# Patient Record
Sex: FEMALE | Race: WHITE | Employment: FULL TIME | URBAN - METROPOLITAN AREA
[De-identification: names, ages, dates, MRNs, and addresses within clinical notes are randomized per-mention and may not be internally consistent; named-entity substitution may affect disease eponyms.]

---

## 2020-07-28 ENCOUNTER — HOSPITAL ENCOUNTER (OUTPATIENT)
Dept: MAMMOGRAPHY | Age: 30
Discharge: HOME | End: 2020-07-28
Attending: NURSE PRACTITIONER
Payer: COMMERCIAL

## 2020-07-28 DIAGNOSIS — N64.3 GALACTORRHEA: ICD-10-CM

## 2020-07-28 PROCEDURE — 76642 ULTRASOUND BREAST LIMITED: CPT

## 2020-07-28 PROCEDURE — 77066 DX MAMMO INCL CAD BI: CPT

## 2023-04-28 SDOH — ECONOMIC STABILITY: HOUSING INSECURITY
IN THE LAST 12 MONTHS, WAS THERE A TIME WHEN YOU DID NOT HAVE A STEADY PLACE TO SLEEP OR SLEPT IN A SHELTER (INCLUDING NOW)?: NO

## 2023-04-28 SDOH — ECONOMIC STABILITY: FOOD INSECURITY: WITHIN THE PAST 12 MONTHS, YOU WORRIED THAT YOUR FOOD WOULD RUN OUT BEFORE YOU GOT MONEY TO BUY MORE.: NEVER TRUE

## 2023-04-28 SDOH — ECONOMIC STABILITY: FOOD INSECURITY: WITHIN THE PAST 12 MONTHS, THE FOOD YOU BOUGHT JUST DIDN'T LAST AND YOU DIDN'T HAVE MONEY TO GET MORE.: NEVER TRUE

## 2023-04-28 SDOH — ECONOMIC STABILITY: TRANSPORTATION INSECURITY
IN THE PAST 12 MONTHS, HAS LACK OF TRANSPORTATION KEPT YOU FROM MEETINGS, WORK, OR FROM GETTING THINGS NEEDED FOR DAILY LIVING?: NO

## 2023-04-28 SDOH — ECONOMIC STABILITY: INCOME INSECURITY: HOW HARD IS IT FOR YOU TO PAY FOR THE VERY BASICS LIKE FOOD, HOUSING, MEDICAL CARE, AND HEATING?: NOT HARD AT ALL

## 2023-05-01 ENCOUNTER — OFFICE VISIT (OUTPATIENT)
Dept: OBGYN CLINIC | Age: 33
End: 2023-05-01
Payer: COMMERCIAL

## 2023-05-01 VITALS — WEIGHT: 189.6 LBS

## 2023-05-01 DIAGNOSIS — Z01.419 WELL WOMAN EXAM: Primary | ICD-10-CM

## 2023-05-01 DIAGNOSIS — R07.9 CHEST PAIN, UNSPECIFIED TYPE: ICD-10-CM

## 2023-05-01 DIAGNOSIS — Z11.51 SCREENING FOR HUMAN PAPILLOMAVIRUS (HPV): ICD-10-CM

## 2023-05-01 DIAGNOSIS — Z13.89 SCREENING FOR GENITOURINARY CONDITION: ICD-10-CM

## 2023-05-01 DIAGNOSIS — Z12.4 SCREENING FOR CERVICAL CANCER: ICD-10-CM

## 2023-05-01 DIAGNOSIS — N64.4 BREAST PAIN: ICD-10-CM

## 2023-05-01 LAB
BILIRUBIN, URINE, POC: NEGATIVE
BLOOD URINE, POC: NEGATIVE
GLUCOSE URINE, POC: NEGATIVE
KETONES, URINE, POC: NEGATIVE
LEUKOCYTE ESTERASE, URINE, POC: NORMAL
NITRITE, URINE, POC: NEGATIVE
PH, URINE, POC: 7 (ref 4.6–8)
PROTEIN,URINE, POC: NORMAL
SPECIFIC GRAVITY, URINE, POC: 1.02 (ref 1–1.03)
URINALYSIS CLARITY, POC: CLEAR
URINALYSIS COLOR, POC: YELLOW
UROBILINOGEN, POC: NORMAL

## 2023-05-01 PROCEDURE — 81003 URINALYSIS AUTO W/O SCOPE: CPT | Performed by: OBSTETRICS & GYNECOLOGY

## 2023-05-01 PROCEDURE — 99395 PREV VISIT EST AGE 18-39: CPT | Performed by: OBSTETRICS & GYNECOLOGY

## 2023-05-08 ENCOUNTER — HOSPITAL ENCOUNTER (OUTPATIENT)
Dept: MAMMOGRAPHY | Age: 33
Discharge: HOME | End: 2023-05-11
Payer: COMMERCIAL

## 2023-05-08 DIAGNOSIS — N64.4 BREAST PAIN: ICD-10-CM

## 2023-05-08 LAB
CYTOLOGIST CVX/VAG CYTO: NORMAL
CYTOLOGY CVX/VAG DOC THIN PREP: NORMAL
HPV APTIMA: NEGATIVE
HPV GENOTYPE REFLEX: NORMAL
Lab: NORMAL
PATH REPORT.FINAL DX SPEC: NORMAL
STAT OF ADQ CVX/VAG CYTO-IMP: NORMAL

## 2023-05-08 PROCEDURE — 76642 ULTRASOUND BREAST LIMITED: CPT

## 2023-05-08 PROCEDURE — 77066 DX MAMMO INCL CAD BI: CPT

## 2023-06-05 ENCOUNTER — OFFICE VISIT (OUTPATIENT)
Dept: OBGYN CLINIC | Age: 33
End: 2023-06-05
Payer: COMMERCIAL

## 2023-06-05 VITALS
WEIGHT: 228 LBS | SYSTOLIC BLOOD PRESSURE: 120 MMHG | BODY MASS INDEX: 36.64 KG/M2 | DIASTOLIC BLOOD PRESSURE: 68 MMHG | HEIGHT: 66 IN

## 2023-06-05 DIAGNOSIS — R10.2 PELVIC PAIN IN FEMALE: Primary | ICD-10-CM

## 2023-06-05 PROCEDURE — 76830 TRANSVAGINAL US NON-OB: CPT | Performed by: OBSTETRICS & GYNECOLOGY

## 2023-06-05 PROCEDURE — 99213 OFFICE O/P EST LOW 20 MIN: CPT | Performed by: OBSTETRICS & GYNECOLOGY

## 2023-07-21 ENCOUNTER — TELEPHONE (OUTPATIENT)
Age: 33
End: 2023-07-21

## 2023-07-21 NOTE — TELEPHONE ENCOUNTER
----- Message from [id number] MD Sal sent at 7/21/2023  2:19 PM EDT -----  Please let patient know that recent stress test was normal. Patient can follow up as scheduled.

## 2023-08-25 ENCOUNTER — OFFICE VISIT (OUTPATIENT)
Age: 33
End: 2023-08-25
Payer: COMMERCIAL

## 2023-08-25 VITALS
DIASTOLIC BLOOD PRESSURE: 70 MMHG | HEIGHT: 66 IN | BODY MASS INDEX: 37.61 KG/M2 | SYSTOLIC BLOOD PRESSURE: 120 MMHG | WEIGHT: 234 LBS | HEART RATE: 86 BPM

## 2023-08-25 DIAGNOSIS — R00.2 PALPITATIONS: ICD-10-CM

## 2023-08-25 DIAGNOSIS — R07.2 PRECORDIAL PAIN: Primary | ICD-10-CM

## 2023-08-25 PROCEDURE — 99214 OFFICE O/P EST MOD 30 MIN: CPT | Performed by: INTERNAL MEDICINE

## 2023-08-25 NOTE — PROGRESS NOTES
warm and dry. Neurological:      Mental Status: She is alert and oriented to person, place, and time. Psychiatric:         Mood and Affect: Mood normal.       Medical problems and test results were reviewed with the patient today. DATA REVIEW    LIPID PANEL     No results found for: CHOL  No results found for: TRIG  No results found for: HDL  No results found for: LDLCHOLESTEROL, LDLCALC  No results found for: LABVLDL, VLDL  No results found for: CHOLHDLRATIO    BMP  No results found for: NA, K, CL, CO2, BUN, CREATININE, GLUCOSE, CALCIUM       EKG        CXR/IMAGING        DEVICE INTERROGATION        OUTSIDE RECORDS REVIEW    Records from outside providers have been reviewed and summarized as noted in the HPI, past history and data review sections of this note, and reviewed with patient. .       ASSESSMENT and PLAN    Chris Wang was seen today for palpitations and follow-up. Diagnoses and all orders for this visit:    Precordial pain    Palpitations          IMPRESSION:    Ms. Michael Mendoza presents today for follow-up. We reviewed her recent cardiac testing which all turned out normal.  I reassured her her heart seems to be in good shape. We discussed the importance of can continued efforts at smoking cessation. I am happy see patient back on an as-needed basis. Return if symptoms worsen or fail to improve. Thank you for allowing me to participate in this patient's care. Please call or contact me if there are any questions or concerns regarding the above.       Adela Shirley III, MD  08/25/23  8:52 AM

## 2024-06-01 ENCOUNTER — HOSPITAL ENCOUNTER (EMERGENCY)
Age: 34
Discharge: HOME | End: 2024-06-01
Attending: EMERGENCY MEDICINE
Payer: COMMERCIAL

## 2024-06-01 VITALS
TEMPERATURE: 98.8 F | DIASTOLIC BLOOD PRESSURE: 75 MMHG | RESPIRATION RATE: 17 BRPM | HEART RATE: 85 BPM | SYSTOLIC BLOOD PRESSURE: 135 MMHG | BODY MASS INDEX: 36.48 KG/M2 | OXYGEN SATURATION: 100 % | WEIGHT: 227 LBS | HEIGHT: 66 IN

## 2024-06-01 DIAGNOSIS — M54.31 SCIATICA OF RIGHT SIDE: Primary | ICD-10-CM

## 2024-06-01 PROCEDURE — 2580000003 HC RX 258: Performed by: EMERGENCY MEDICINE

## 2024-06-01 PROCEDURE — 99284 EMERGENCY DEPT VISIT MOD MDM: CPT

## 2024-06-01 PROCEDURE — 96372 THER/PROPH/DIAG INJ SC/IM: CPT

## 2024-06-01 PROCEDURE — 6370000000 HC RX 637 (ALT 250 FOR IP): Performed by: EMERGENCY MEDICINE

## 2024-06-01 PROCEDURE — 6360000002 HC RX W HCPCS: Performed by: EMERGENCY MEDICINE

## 2024-06-01 RX ORDER — PREDNISONE 50 MG/1
50 TABLET ORAL DAILY
Qty: 5 TABLET | Refills: 0 | Status: SHIPPED | OUTPATIENT
Start: 2024-06-01 | End: 2024-06-06

## 2024-06-01 RX ORDER — ORPHENADRINE CITRATE 100 MG/1
100 TABLET, EXTENDED RELEASE ORAL
Status: DISCONTINUED | OUTPATIENT
Start: 2024-06-01 | End: 2024-06-01

## 2024-06-01 RX ORDER — CYCLOBENZAPRINE HCL 10 MG
10 TABLET ORAL
Status: COMPLETED | OUTPATIENT
Start: 2024-06-01 | End: 2024-06-01

## 2024-06-01 RX ADMIN — WATER 125 MG: 1 INJECTION INTRAMUSCULAR; INTRAVENOUS; SUBCUTANEOUS at 14:02

## 2024-06-01 RX ADMIN — CYCLOBENZAPRINE 10 MG: 10 TABLET, FILM COATED ORAL at 14:08

## 2024-06-01 RX ADMIN — FENTANYL CITRATE 50 MCG: 50 INJECTION INTRAMUSCULAR; INTRAVENOUS at 14:02

## 2024-06-01 ASSESSMENT — PAIN SCALES - GENERAL
PAINLEVEL_OUTOF10: 2
PAINLEVEL_OUTOF10: 2
PAINLEVEL_OUTOF10: 8

## 2024-06-01 ASSESSMENT — ENCOUNTER SYMPTOMS
FACIAL SWELLING: 0
COUGH: 0
NAUSEA: 0
VOMITING: 0
SORE THROAT: 0
BACK PAIN: 1
TROUBLE SWALLOWING: 0
CHEST TIGHTNESS: 0
ABDOMINAL PAIN: 0
SHORTNESS OF BREATH: 0
VOICE CHANGE: 0
EYE PAIN: 0

## 2024-06-01 ASSESSMENT — PAIN DESCRIPTION - LOCATION
LOCATION: BACK;LEG
LOCATION: BACK

## 2024-06-01 ASSESSMENT — PAIN DESCRIPTION - DESCRIPTORS: DESCRIPTORS: SHOOTING;STABBING

## 2024-06-01 ASSESSMENT — PAIN DESCRIPTION - PAIN TYPE: TYPE: ACUTE PAIN

## 2024-06-01 ASSESSMENT — PAIN DESCRIPTION - ONSET: ONSET: PROGRESSIVE

## 2024-06-01 ASSESSMENT — PAIN - FUNCTIONAL ASSESSMENT
PAIN_FUNCTIONAL_ASSESSMENT: 0-10
PAIN_FUNCTIONAL_ASSESSMENT: ACTIVITIES ARE NOT PREVENTED
PAIN_FUNCTIONAL_ASSESSMENT: 0-10

## 2024-06-01 ASSESSMENT — PAIN DESCRIPTION - FREQUENCY: FREQUENCY: CONTINUOUS

## 2024-06-01 ASSESSMENT — PAIN DESCRIPTION - ORIENTATION: ORIENTATION: RIGHT

## 2024-06-01 NOTE — ED TRIAGE NOTES
Pt to the ED from home with c/o of right lower back pain that started Monday. Pt states that she has sciatic and has tried several things to help with the pain.

## 2024-06-01 NOTE — DISCHARGE INSTRUCTIONS
If you are still having continued back pain.  I recommend you have an outpatient MRI of your back.  Please follow-up with your primary care doctor.

## 2024-06-01 NOTE — ED PROVIDER NOTES
Emergency Department Provider Note       PCP: Alexsander Burnett DO   Age: 33 y.o.   Sex: female     DISPOSITION Decision To Discharge 06/01/2024 03:12:08 PM       ICD-10-CM    1. Sciatica of right side  M54.31           Medical Decision Making     Level 4 04505  33-year-old female presents to the emergency department with chief complaint of right-sided sciatica back pain.  Vital signs here are reviewed.  External records were reviewed.  Shared decision-making was utilized in the patient's visit.  Patient has no red flags for acute low back pain.  Patient be given symptomatic treatment here and observed.  On reobservation of the patient she is feeling much more comfortable.  Her pain is well-controlled.  Patient be written for short course of steroids for home.  She already has diclofenac and Flexeril at home.  Patient was counseled on outpatient MRI as well as return precautions into the emergency department.  Patient is stable and discharge examination     1 or more chronic illnesses with a severe exacerbation or progression.  Shared medical decision making was utilized in creating the patients health plan today.    I independently ordered and reviewed each unique test.  I reviewed external records: ED visit note from an outside group.  I reviewed external records: provider visit note from PCP.  I reviewed external records: provider visit note from outside specialist.                   History     33-year-old female presents to the emergency department via private vehicle with chief complaint of right-sided sciatica back pain.  Patient reports longstanding history of right-sided sciatica pain.  It radiates down her right leg towards her right calf.  She denies any bowel or bladder incontinence.  She denies any saddle anesthesia.  She has never had an outpatient MRI of her spine.          ROS     Review of Systems   Constitutional:  Negative for activity change, chills and fever.   HENT:  Negative for dental  of motion and neck supple. No rigidity or tenderness.   Skin:     General: Skin is warm and dry.      Capillary Refill: Capillary refill takes less than 2 seconds.   Neurological:      General: No focal deficit present.      Mental Status: She is alert and oriented to person, place, and time. Mental status is at baseline.   Psychiatric:         Mood and Affect: Mood normal.         Behavior: Behavior normal.        Procedures     Procedures    No orders of the defined types were placed in this encounter.       Medications given during this emergency department visit:  Medications   methylPREDNISolone sodium succ (SOLU-MEDROL) 125 mg in sterile water 2 mL injection (125 mg IntraMUSCular Given 6/1/24 1402)   fentaNYL (SUBLIMAZE) injection 50 mcg (50 mcg IntraMUSCular Given 6/1/24 1402)   cyclobenzaprine (FLEXERIL) tablet 10 mg (10 mg Oral Given 6/1/24 1408)       New Prescriptions    PREDNISONE (DELTASONE) 50 MG TABLET    Take 1 tablet by mouth daily for 5 days        No past medical history on file.     Past Surgical History:   Procedure Laterality Date    CHOLECYSTECTOMY      HEENT  1994    tonsils        Social History     Socioeconomic History    Marital status:    Tobacco Use    Smoking status: Every Day     Current packs/day: 0.50     Types: Cigarettes     Passive exposure: Yes    Smokeless tobacco: Never   Substance and Sexual Activity    Alcohol use: No    Drug use: No        Previous Medications    LEVONORGESTREL (MIRENA) IUD 52 MG    1 Device by IntraUTERine route once        No results found for any visits on 06/01/24.      No orders to display                No results for input(s): \"COVID19\" in the last 72 hours.    Voice dictation software was used during the making of this note.  This software is not perfect and grammatical and other typographical errors may be present.  This note has not been completely proofread for errors.     Manny Garnett DO  06/01/24 [id number]

## 2024-09-08 SDOH — ECONOMIC STABILITY: FOOD INSECURITY: WITHIN THE PAST 12 MONTHS, YOU WORRIED THAT YOUR FOOD WOULD RUN OUT BEFORE YOU GOT MONEY TO BUY MORE.: NEVER TRUE

## 2024-09-08 SDOH — ECONOMIC STABILITY: INCOME INSECURITY: HOW HARD IS IT FOR YOU TO PAY FOR THE VERY BASICS LIKE FOOD, HOUSING, MEDICAL CARE, AND HEATING?: NOT HARD AT ALL

## 2024-09-08 SDOH — ECONOMIC STABILITY: FOOD INSECURITY: WITHIN THE PAST 12 MONTHS, THE FOOD YOU BOUGHT JUST DIDN'T LAST AND YOU DIDN'T HAVE MONEY TO GET MORE.: NEVER TRUE

## 2024-09-11 ENCOUNTER — OFFICE VISIT (OUTPATIENT)
Dept: OBGYN CLINIC | Age: 34
End: 2024-09-11
Payer: COMMERCIAL

## 2024-09-11 VITALS
DIASTOLIC BLOOD PRESSURE: 70 MMHG | BODY MASS INDEX: 38.27 KG/M2 | HEIGHT: 66 IN | SYSTOLIC BLOOD PRESSURE: 126 MMHG | WEIGHT: 238.1 LBS

## 2024-09-11 DIAGNOSIS — Z01.419 WELL WOMAN EXAM: Primary | ICD-10-CM

## 2024-09-11 DIAGNOSIS — Z13.89 SCREENING FOR GENITOURINARY CONDITION: ICD-10-CM

## 2024-09-11 LAB
BILIRUBIN, URINE, POC: NORMAL
BLOOD URINE, POC: NEGATIVE
GLUCOSE URINE, POC: NEGATIVE
KETONES, URINE, POC: NEGATIVE
LEUKOCYTE ESTERASE, URINE, POC: NORMAL
NITRITE, URINE, POC: NEGATIVE
PH, URINE, POC: 6 (ref 4.6–8)
PROTEIN,URINE, POC: NEGATIVE
SPECIFIC GRAVITY, URINE, POC: 1.02 (ref 1–1.03)
URINALYSIS CLARITY, POC: CLEAR
URINALYSIS COLOR, POC: YELLOW
UROBILINOGEN, POC: NORMAL

## 2024-09-11 PROCEDURE — 99395 PREV VISIT EST AGE 18-39: CPT | Performed by: OBSTETRICS & GYNECOLOGY

## 2024-09-11 PROCEDURE — 81002 URINALYSIS NONAUTO W/O SCOPE: CPT | Performed by: OBSTETRICS & GYNECOLOGY

## 2024-09-11 RX ORDER — DICLOFENAC SODIUM 75 MG/1
75 TABLET, DELAYED RELEASE ORAL 2 TIMES DAILY
COMMUNITY

## 2024-09-11 RX ORDER — CYCLOBENZAPRINE HCL 10 MG
10 TABLET ORAL 2 TIMES DAILY PRN
COMMUNITY

## 2024-09-11 RX ORDER — SEMAGLUTIDE 0.25 MG/.5ML
0.25 INJECTION, SOLUTION SUBCUTANEOUS
COMMUNITY

## 2024-09-11 RX ORDER — ESCITALOPRAM OXALATE 10 MG/1
15 TABLET ORAL DAILY
COMMUNITY

## 2024-12-01 NOTE — PROGRESS NOTES
GYN US
US findings from today   [address] performed secondary to pelvic pain  CX appears wnl  Uterus is retroverted and homogenous  Endo=4.9 mm, IUD visualized in upper central endometrium with T arms extended laterally   ROV is visualized with numerous small follicles around periphery, plump appearance and increased volume. LOV is visualized with numerous small follicles around periphery, plump appearance and increased volume. Appearance of ovaries C/W PCOS  Trace free fluid seen in PCDS. Volume: 47.12    Normal pelvic USG with PCOS. Possibility of cyst rupture discussed. No other etiology to describe pelvic pain identified. Discussed diagnostic laparoscopy vs. CT scan and referral to GI. She elects to monitor and will notify us of further concerns.
01-Dec-2024 20:29

## 2025-05-06 ENCOUNTER — HOSPITAL ENCOUNTER (EMERGENCY)
Age: 35
Discharge: HOME | End: 2025-05-06
Payer: COMMERCIAL

## 2025-05-06 ENCOUNTER — APPOINTMENT (OUTPATIENT)
Dept: GENERAL RADIOLOGY | Age: 35
End: 2025-05-06
Payer: COMMERCIAL

## 2025-05-06 VITALS
BODY MASS INDEX: 39.37 KG/M2 | DIASTOLIC BLOOD PRESSURE: 72 MMHG | OXYGEN SATURATION: 99 % | RESPIRATION RATE: 18 BRPM | HEIGHT: 66 IN | WEIGHT: 245 LBS | TEMPERATURE: 98 F | HEART RATE: 71 BPM | SYSTOLIC BLOOD PRESSURE: 109 MMHG

## 2025-05-06 DIAGNOSIS — M54.31 SCIATICA OF RIGHT SIDE: ICD-10-CM

## 2025-05-06 DIAGNOSIS — N39.0 URINARY TRACT INFECTION WITHOUT HEMATURIA, SITE UNSPECIFIED: ICD-10-CM

## 2025-05-06 DIAGNOSIS — S39.012A STRAIN OF LUMBAR REGION, INITIAL ENCOUNTER: Primary | ICD-10-CM

## 2025-05-06 LAB
APPEARANCE UR: ABNORMAL
BACTERIA URNS QL MICRO: ABNORMAL /HPF
BILIRUB UR QL: NEGATIVE
COLOR UR: YELLOW
EPI CELLS #/AREA URNS HPF: ABNORMAL /HPF
GLUCOSE UR STRIP.AUTO-MCNC: NEGATIVE MG/DL
HGB UR QL STRIP: NEGATIVE
KETONES UR QL STRIP.AUTO: NEGATIVE MG/DL
LEUKOCYTE ESTERASE UR QL STRIP.AUTO: ABNORMAL
MUCOUS THREADS URNS QL MICRO: 0 /LPF
NITRITE UR QL STRIP.AUTO: NEGATIVE
OTHER OBSERVATIONS: ABNORMAL
PH UR STRIP: 5.5 (ref 5–9)
PROT UR STRIP-MCNC: NEGATIVE MG/DL
RBC #/AREA URNS HPF: 0 /HPF
SP GR UR REFRACTOMETRY: 1.01 (ref 1–1.02)
UROBILINOGEN UR QL STRIP.AUTO: 0.2 EU/DL (ref 0.2–1)
WBC URNS QL MICRO: ABNORMAL /HPF

## 2025-05-06 PROCEDURE — 81015 MICROSCOPIC EXAM OF URINE: CPT

## 2025-05-06 PROCEDURE — 72100 X-RAY EXAM L-S SPINE 2/3 VWS: CPT

## 2025-05-06 PROCEDURE — 96372 THER/PROPH/DIAG INJ SC/IM: CPT

## 2025-05-06 PROCEDURE — 81003 URINALYSIS AUTO W/O SCOPE: CPT

## 2025-05-06 PROCEDURE — 99284 EMERGENCY DEPT VISIT MOD MDM: CPT

## 2025-05-06 PROCEDURE — 73502 X-RAY EXAM HIP UNI 2-3 VIEWS: CPT

## 2025-05-06 PROCEDURE — 87086 URINE CULTURE/COLONY COUNT: CPT

## 2025-05-06 PROCEDURE — 6370000000 HC RX 637 (ALT 250 FOR IP): Performed by: PHYSICIAN ASSISTANT

## 2025-05-06 PROCEDURE — 6360000002 HC RX W HCPCS: Performed by: PHYSICIAN ASSISTANT

## 2025-05-06 RX ORDER — LIDOCAINE 50 MG/G
1 PATCH TOPICAL DAILY
Qty: 30 PATCH | Refills: 0 | Status: SHIPPED | OUTPATIENT
Start: 2025-05-06 | End: 2025-06-05

## 2025-05-06 RX ORDER — LIDOCAINE 4 G/G
2 PATCH TOPICAL
Status: DISCONTINUED | OUTPATIENT
Start: 2025-05-06 | End: 2025-05-06 | Stop reason: HOSPADM

## 2025-05-06 RX ORDER — METHOCARBAMOL 500 MG/1
500 TABLET, FILM COATED ORAL 3 TIMES DAILY PRN
Qty: 30 TABLET | Refills: 0 | Status: SHIPPED | OUTPATIENT
Start: 2025-05-06 | End: 2025-05-16

## 2025-05-06 RX ORDER — HYDROMORPHONE HYDROCHLORIDE 1 MG/ML
1 INJECTION, SOLUTION INTRAMUSCULAR; INTRAVENOUS; SUBCUTANEOUS ONCE
Status: COMPLETED | OUTPATIENT
Start: 2025-05-06 | End: 2025-05-06

## 2025-05-06 RX ORDER — DIAZEPAM 5 MG/1
5 TABLET ORAL ONCE
Status: COMPLETED | OUTPATIENT
Start: 2025-05-06 | End: 2025-05-06

## 2025-05-06 RX ORDER — KETOROLAC TROMETHAMINE 30 MG/ML
60 INJECTION, SOLUTION INTRAMUSCULAR; INTRAVENOUS ONCE
Status: COMPLETED | OUTPATIENT
Start: 2025-05-06 | End: 2025-05-06

## 2025-05-06 RX ORDER — SULFAMETHOXAZOLE AND TRIMETHOPRIM 800; 160 MG/1; MG/1
1 TABLET ORAL 2 TIMES DAILY
Qty: 20 TABLET | Refills: 0 | Status: SHIPPED | OUTPATIENT
Start: 2025-05-06 | End: 2025-05-16

## 2025-05-06 RX ORDER — DEXAMETHASONE SODIUM PHOSPHATE 10 MG/ML
10 INJECTION, SOLUTION INTRA-ARTICULAR; INTRALESIONAL; INTRAMUSCULAR; INTRAVENOUS; SOFT TISSUE
Status: COMPLETED | OUTPATIENT
Start: 2025-05-06 | End: 2025-05-06

## 2025-05-06 RX ORDER — DICLOFENAC POTASSIUM 50 MG/1
50 TABLET, FILM COATED ORAL 3 TIMES DAILY PRN
Qty: 30 TABLET | Refills: 0 | Status: SHIPPED | OUTPATIENT
Start: 2025-05-06

## 2025-05-06 RX ADMIN — HYDROMORPHONE HYDROCHLORIDE 1 MG: 1 INJECTION, SOLUTION INTRAMUSCULAR; INTRAVENOUS; SUBCUTANEOUS at 12:04

## 2025-05-06 RX ADMIN — KETOROLAC TROMETHAMINE 60 MG: 60 INJECTION, SOLUTION INTRAMUSCULAR at 12:00

## 2025-05-06 RX ADMIN — DIAZEPAM 5 MG: 5 TABLET ORAL at 11:57

## 2025-05-06 RX ADMIN — DEXAMETHASONE SODIUM PHOSPHATE 10 MG: 10 INJECTION INTRAMUSCULAR; INTRAVENOUS at 11:57

## 2025-05-06 ASSESSMENT — PAIN SCALES - GENERAL
PAINLEVEL_OUTOF10: 10
PAINLEVEL_OUTOF10: 4
PAINLEVEL_OUTOF10: 10
PAINLEVEL_OUTOF10: 10

## 2025-05-06 ASSESSMENT — PAIN - FUNCTIONAL ASSESSMENT: PAIN_FUNCTIONAL_ASSESSMENT: 0-10

## 2025-05-06 ASSESSMENT — PAIN DESCRIPTION - LOCATION
LOCATION: BACK
LOCATION: BACK
LOCATION: BACK;LEG
LOCATION: BACK

## 2025-05-06 NOTE — ED PROVIDER NOTES
Emergency Department Provider Note       PCP: Alexsander Burnett DO   Age: 34 y.o.   Sex: female     DISPOSITION Decision To Discharge 05/06/2025 02:40:38 PM   DISPOSITION CONDITION Stable            ICD-10-CM    1. Strain of lumbar region, initial encounter  S39.012A       2. Sciatica of right side  M54.31       3. Urinary tract infection without hematuria, site unspecified  N39.0           Medical Decision Making     Patient had a lumbar MRI on July 13 through 2024 at [facility].  It showed nothing emergent last year.  She has been fine since then until she bent over to get something out of her close door a week ago and has had pain since then.  No loss or retention of her urine or bowels.  She states it does hurt to get up and down off the toilet so she has not been peeing as much.  The urine did show a urinary tract infection today.  I have sent the urine for culture.  I will cover with Bactrim DS.  She is allergic to Ceclor and had what sounds like swelling of her throat when she was a child.  Her mother is here with her and driving her.  She is feeling better after the cocktail of medication here.  I think she has strained her back and was advised to use warm moist heat, massage and stretching multiple times a day.  I will send her with medication at home for her symptoms.  Patient was encouraged to follow-up with her PCP for recheck and may benefit from physical therapy which her doctor can order.  I have written a note for work for this week.  Patient is neurologically intact and there are no urgent or emergent signs or symptoms at this time to indicate an emergent need for further evaluation emergently here in the ED.  She is stable for discharge at this time.     1 or more acute illnesses that pose a threat to life or bodily function.   Over the counter drug management performed.  Prescription drug management performed.  Chronic medical problems impacting care include history of back pain.  Shared medical

## 2025-05-06 NOTE — DISCHARGE INSTRUCTIONS
For your back, use warm moist heat, massage, stretching multiple times a day and take the medication as directed if needed.  You did receive a cocktail of medications here in the ED which I think will help your symptoms.  1 of those was a shot of Decadron which is a steroid which should stay in your body for a couple of days for the inflammation.  Avoid bending or lifting with straight legs, make sure you do bend your legs when you are doing these activities. The urine does show infection today. Please take the antibiotics as directed and finish all of them. The urine was sent for culture.  This could take 4 to 5 days to result and if it shows that we need to change your antibiotics, we will call you and let you know.  If you are worsening in any way prior to then, please return to the ED for further evaluation immediately.  Drink plenty of fluids and rest. Please follow up with PCP for recheck.

## 2025-05-06 NOTE — ED TRIAGE NOTES
Pt to the ED from home with mother with c/o of lower back pain and right leg. Pt states that she has sciatica and was seen at the chiropractor last week and was told that she had a bulging disc.

## 2025-05-06 NOTE — ED NOTES
Patient mobility status  with no difficulty.     I have reviewed discharge instructions with the patient.  The patient verbalized understanding.    Patient left ED via Discharge Method: wheelchair to Home with Parent.    Opportunity for questions and clarification provided.     Patient given 3 scripts.

## 2025-05-08 LAB
BACTERIA SPEC CULT: NORMAL
SERVICE CMNT-IMP: NORMAL